# Patient Record
Sex: MALE | Race: BLACK OR AFRICAN AMERICAN | NOT HISPANIC OR LATINO | Employment: OTHER | URBAN - METROPOLITAN AREA
[De-identification: names, ages, dates, MRNs, and addresses within clinical notes are randomized per-mention and may not be internally consistent; named-entity substitution may affect disease eponyms.]

---

## 2017-01-03 ENCOUNTER — HOSPITAL ENCOUNTER (OUTPATIENT)
Dept: PHYSICAL THERAPY | Facility: HOSPITAL | Age: 47
Setting detail: THERAPIES SERIES
End: 2017-01-03

## 2017-01-10 ENCOUNTER — APPOINTMENT (OUTPATIENT)
Dept: PHYSICAL THERAPY | Facility: HOSPITAL | Age: 47
End: 2017-01-10

## 2017-01-13 ENCOUNTER — APPOINTMENT (OUTPATIENT)
Dept: PHYSICAL THERAPY | Facility: HOSPITAL | Age: 47
End: 2017-01-13

## 2017-01-17 ENCOUNTER — HOSPITAL ENCOUNTER (OUTPATIENT)
Dept: PHYSICAL THERAPY | Facility: HOSPITAL | Age: 47
Setting detail: THERAPIES SERIES
Discharge: HOME | End: 2017-01-17

## 2017-01-20 ENCOUNTER — APPOINTMENT (OUTPATIENT)
Dept: PHYSICAL THERAPY | Facility: HOSPITAL | Age: 47
End: 2017-01-20

## 2017-01-24 ENCOUNTER — APPOINTMENT (OUTPATIENT)
Dept: PHYSICAL THERAPY | Facility: HOSPITAL | Age: 47
End: 2017-01-24

## 2017-01-27 ENCOUNTER — APPOINTMENT (OUTPATIENT)
Dept: PHYSICAL THERAPY | Facility: HOSPITAL | Age: 47
End: 2017-01-27

## 2017-02-09 ENCOUNTER — DOCUMENTATION (OUTPATIENT)
Dept: PHYSICAL THERAPY | Facility: HOSPITAL | Age: 47
End: 2017-02-09

## 2017-02-09 DIAGNOSIS — M05.762 RHEUMATOID ARTHRITIS INVOLVING BOTH KNEES WITH POSITIVE RHEUMATOID FACTOR (HCC): ICD-10-CM

## 2017-02-09 DIAGNOSIS — M25.561 CHRONIC PAIN OF BOTH KNEES: Primary | ICD-10-CM

## 2017-02-09 DIAGNOSIS — G89.29 CHRONIC PAIN OF BOTH KNEES: Primary | ICD-10-CM

## 2017-02-09 DIAGNOSIS — M25.562 CHRONIC PAIN OF BOTH KNEES: Primary | ICD-10-CM

## 2017-02-09 DIAGNOSIS — M05.761 RHEUMATOID ARTHRITIS INVOLVING BOTH KNEES WITH POSITIVE RHEUMATOID FACTOR (HCC): ICD-10-CM

## 2017-02-09 NOTE — THERAPY DISCHARGE NOTE
Outpatient Physical Therapy Discharge Summary         Patient Name: Franklyn Rose  : 1970  MRN: [id number]    Today's Date: 2017    Visit Dx:    ICD-10-CM ICD-9-CM   1. Chronic pain of both knees M25.561 719.46    M25.562 338.29    G89.29    2. Rheumatoid arthritis involving both knees with positive rheumatoid factor M05.761 714.0    M05.762              PT OP Goals       17 1500          PT Short Term Goals    STG Date to Achieve 16  -KH      STG 1 Patient will demonstrate safety and independence with initial HEP  -KH      STG 1 Progress Met  -KH      STG 2 Patient will increase B knee strength from 3-/5 to 3+/5 or greater to increase LE stability  -KH      STG 2 Progress Not Met  -KH      STG 3 Patient will increase R hand  strength from 24 lbs to 30 lbs or greater to improve patient's ease with household tasks  -      STG 3 Progress Not Met  -KH      Long Term Goals    LTG Date to Achieve 17  -KH      LTG 1 Patient will demonstrate safety and independence with advanced HEP  -KH      LTG 1 Progress Not Met  -KH      LTG 2 Patient will increase B knee strength to 4-/5 or greater to increase LE stability  -KH      LTG 2 Progress Not Met  -KH      LTG 3 Patient will improve level of perceived disability as measured by the KOS from 100% disability to 80% disability or less  -      LTG 3 Progress Not Met  -KH      LTG 4 Patient will decrease risk of falls as evidenced by improving 5xSTS time from 43 secconds to 35 seconds or less (with use of UE)  -      LTG 4 Progress Not Met  -KH        User Key  (r) = Recorded By, (t) = Taken By, (c) = Cosigned By    Initials Name Provider Type    TUYET Higuera PT Physical Therapist          OP PT Discharge Summary  Date of Discharge: 17  Reason for Discharge: Unable to participate  Outcomes Achieved: Unable to make functional progress toward goals at this time  Discharge Destination: Home with home program  Discharge Instructions:  cancelled all remaining apointments due to transportation issues      Time Calculation:                    Ning iHguera, PT  2/9/2017